# Patient Record
Sex: MALE | Race: WHITE | NOT HISPANIC OR LATINO
[De-identification: names, ages, dates, MRNs, and addresses within clinical notes are randomized per-mention and may not be internally consistent; named-entity substitution may affect disease eponyms.]

---

## 2017-06-29 ENCOUNTER — TRANSCRIPTION ENCOUNTER (OUTPATIENT)
Age: 52
End: 2017-06-29

## 2018-04-24 ENCOUNTER — HOSPITAL ENCOUNTER (EMERGENCY)
Dept: HOSPITAL 74 - FER | Age: 53
Discharge: HOME | End: 2018-04-24
Payer: COMMERCIAL

## 2018-04-24 VITALS — TEMPERATURE: 97.6 F | HEART RATE: 55 BPM | DIASTOLIC BLOOD PRESSURE: 88 MMHG | SYSTOLIC BLOOD PRESSURE: 168 MMHG

## 2018-04-24 VITALS — BODY MASS INDEX: 35.2 KG/M2

## 2018-04-24 DIAGNOSIS — F17.210: ICD-10-CM

## 2018-04-24 DIAGNOSIS — N20.0: Primary | ICD-10-CM

## 2018-04-24 DIAGNOSIS — J45.909: ICD-10-CM

## 2018-04-24 LAB
APPEARANCE UR: CLEAR
BILIRUB UR STRIP.AUTO-MCNC: NEGATIVE MG/DL
COLOR UR: YELLOW
EPITH CASTS URNS QL MICRO: (no result) /HPF
HYALINE CASTS URNS QL MICRO: 11 /LPF
KETONES UR QL STRIP: NEGATIVE
LEUKOCYTE ESTERASE UR QL STRIP.AUTO: NEGATIVE
MUCOUS THREADS URNS QL MICRO: (no result)
NITRITE UR QL STRIP: NEGATIVE
PH UR: 5 [PH] (ref 5–8)
PROT UR QL STRIP: NEGATIVE
PROT UR QL STRIP: NEGATIVE
RBC # UR STRIP: (no result) /UL
SP GR UR: 1.02 (ref 1–1.03)
UROBILINOGEN UR STRIP-MCNC: NEGATIVE MG/DL (ref 0.2–1)

## 2018-04-24 PROCEDURE — 3E0337Z INTRODUCTION OF ELECTROLYTIC AND WATER BALANCE SUBSTANCE INTO PERIPHERAL VEIN, PERCUTANEOUS APPROACH: ICD-10-PCS

## 2018-04-24 PROCEDURE — 3E0333Z INTRODUCTION OF ANTI-INFLAMMATORY INTO PERIPHERAL VEIN, PERCUTANEOUS APPROACH: ICD-10-PCS

## 2018-04-24 PROCEDURE — 3E033NZ INTRODUCTION OF ANALGESICS, HYPNOTICS, SEDATIVES INTO PERIPHERAL VEIN, PERCUTANEOUS APPROACH: ICD-10-PCS

## 2018-04-24 PROCEDURE — 3E033GC INTRODUCTION OF OTHER THERAPEUTIC SUBSTANCE INTO PERIPHERAL VEIN, PERCUTANEOUS APPROACH: ICD-10-PCS

## 2018-04-24 NOTE — PDOC
History of Present Illness





- General


Chief Complaint: Pain, Acute


Stated Complaint: RIGHT FLANK PAIN


Time Seen by Provider: 04/24/18 02:53





- History of Present Illness


Initial Comments: 





He this 53-year-old man with a history of renal colic (right sided) a few years 

ago presents with a few hour history of right-sided flank pain.  Patient states 

that pain is typical of his previous renal colic pain.  He also developed 

nausea just prior to presentation.  No fever/chills/vomiting/dysuria.  Previous 

episode of renal stones did not require cystoscopy or other instrumentation; 

stone passed spontaneously.  Patient denies any other medical problems.








Past History





- Past Medical History


Allergies/Adverse Reactions: 


 Allergies











Allergy/AdvReac Type Severity Reaction Status Date / Time


 


No Known Allergies Allergy   Verified 04/24/18 03:44











Home Medications: 


Ambulatory Orders





Oxycodone HCl/Acetaminophen [Percocet 5-325 mg Tablet] 1 tab PO Q6H PRN #12 

tablet MDD 3 tabs 04/24/18 


Tamsulosin HCl [Flomax] 0.4 mg PO DAILY #20 cap.er.24h 04/24/18 








Anemia: No


Asthma: Yes


Cancer: No


Cardiac Disorders: No


CVA: No


COPD: No


CHF: No


Dementia: No


Diabetes: No


GI Disorders: Yes (DIVERTICULITIS)


 Disorders: No


HTN: No


Hypercholesterolemia: No


Liver Disease: No


Seizures: No


Thyroid Disease: No





- Surgical History


Abdominal Surgery: Yes (RIGHT INGUINAL AND UMBILICAL HERNIA REPAIR 2002,LEFT 

INGUINAL HERNIA 1990'S)


Appendectomy: No


Cardiac Surgery: No


Cholecystectomy: No


Lung Surgery: No


Neurologic Surgery: No


Orthopedic Surgery: No





- Suicide/Smoking/Psychosocial Hx


Smoking History: Current every day smoker


Have you smoked in the past 12 months: Yes


Number of Cigarettes Smoked Daily: 40


Hx Alcohol Use: Yes (RARE)


Drug/Substance Use Hx: No


Substance Use Type: None


Hx Substance Use Treatment: No





**Review of Systems





- Review of Systems


Able to Perform ROS?: Yes


Comments:: 





12 point review of systems is negative except for what is noted in the history 

of present illness








*Physical Exam





- Physical Exam


Comments: 





GENERAL: Adult male, alert and oriented 3, in moderate distress secondary to 

right flank pain


HEAD: Normal with no signs of trauma.


EYES: PERRLA, EOMI, sclera anicteric, conjunctiva clear.


ENT: Ears normal, nares patent, oropharynx clear without exudates.  Dry mucous 

membranes.


NECK: Normal range of motion, supple without lymphadenopathy, JVD, or masses.


LUNGS: Breath sounds equal, clear to auscultation bilaterally.  No wheezes, and 

no crackles.


HEART:Regular rate and rhythm, normal S1 and S2 without murmur, rub or gallop.


ABDOMEN:.normal bowel sounds  No guarding,tenderness or rebound.No masses No 

distention. 


EXTREMITIES: Normal range of motion, no edema.  No clubbing or cyanosis. No 

erythema, or tenderness.


NEUROLOGICAL: Cranial nerves II through XII grossly intact.  Normal speech.  No 

focal 


neurological deficits. 


MUSCULOSKELETAL:  Back non-tender to palpation, right CVA tenderness


SKIN: Warm, Dry, normal turgor, no rashes or lesions noted.








Progress Note





- Progress Note


Progress Note: 





Urinalysis positive for 60 RBCs cells per high-power field





Patient given Toradol 30 mg IV; he also received a liter normal saline IV.





Patient had relief from pain with Toradol (lasting approximately 20 minutes).  

Pain recurred.  Patient given Dilaudid 0.5 mg IV





Renal stone protocol CT performed.  This revealed 1 mm distal ureter/UVJ 

junction stone.  There is moderate hydroureter nephrosis/hydroureter proximal 

to the stone.  No other renal stones evident.  No other significant abnormality 

seen.





Results of this study discussed with the patient.  Patient will have an 

additional liter of fluid and 0.4 mg of Flomax by mouth.





Medical Decision Making





- Medical Decision Making





04/24/18 05:33


Patient reports complete resolution of pain after 1 L normal saline and 0.4 mg 

Flomax.  Patient received 0.5 mg Dilaudid IV and 30 mg Toradol earlier.





*DC/Admit/Observation/Transfer


Diagnosis at time of Disposition: 


 Renal colic








- Discharge Dispostion


Disposition: HOME


Condition at time of disposition: Stable





- Prescriptions


Prescriptions: 


Oxycodone HCl/Acetaminophen [Percocet 5-325 mg Tablet] 1 tab PO Q6H PRN #12 

tablet MDD 3 tabs


 PRN Reason: Severe Pain


Tamsulosin HCl [Flomax] 0.4 mg PO DAILY #20 cap.er.24h





- Referrals


Referrals: 


Maxi Granda MD [Staff Physician] - 





- Patient Instructions


Printed Discharge Instructions:  Kidney Stones -- Adult


Additional Instructions: 


Drink plenty of water


Flomax 0.4 mg daily


Acetaminophen/ibuprofen/naproxen as needed for mild-to-moderate pain


Percocet 5/325 up to 3 times a day as needed for severe pain


Return to ER if you have persistent severe pain or experience vomiting/fever


Follow-up with urologist, Dr. Granda, if you have persistent pain





- Post Discharge Activity

## 2018-05-18 ENCOUNTER — HOSPITAL ENCOUNTER (EMERGENCY)
Dept: HOSPITAL 74 - FER | Age: 53
Discharge: HOME | End: 2018-05-18
Payer: COMMERCIAL

## 2018-05-18 VITALS — HEART RATE: 68 BPM | DIASTOLIC BLOOD PRESSURE: 88 MMHG | SYSTOLIC BLOOD PRESSURE: 156 MMHG | TEMPERATURE: 98.1 F

## 2018-05-18 VITALS — BODY MASS INDEX: 35.2 KG/M2

## 2018-05-18 DIAGNOSIS — K57.92: Primary | ICD-10-CM

## 2018-05-18 DIAGNOSIS — F17.210: ICD-10-CM

## 2018-05-18 DIAGNOSIS — J45.909: ICD-10-CM

## 2018-05-18 LAB
ALBUMIN SERPL-MCNC: 4.1 G/DL (ref 3.5–5)
ALP SERPL-CCNC: 52 U/L (ref 32–92)
ALT SERPL-CCNC: 17 U/L (ref 10–40)
ANION GAP SERPL CALC-SCNC: 5 MMOL/L (ref 8–16)
AST SERPL-CCNC: 19 U/L (ref 10–42)
BASOPHILS # BLD: 0.6 % (ref 0–2)
BILIRUB SERPL-MCNC: 0.9 MG/DL (ref 0.2–1)
BUN SERPL-MCNC: 8 MG/DL (ref 7–18)
CALCIUM SERPL-MCNC: 8.7 MG/DL (ref 8.4–10.2)
CHLORIDE SERPL-SCNC: 103 MMOL/L (ref 98–107)
CO2 SERPL-SCNC: 25 MMOL/L (ref 22–28)
COLOR UR: (no result)
CREAT SERPL-MCNC: < 0.8 MG/DL (ref 0.6–1.3)
DEPRECATED RDW RBC AUTO: 12.5 % (ref 11.9–15.9)
EOSINOPHIL # BLD: 2.2 % (ref 0–4.5)
GLUCOSE SERPL-MCNC: 121 MG/DL (ref 74–106)
HCT VFR BLD CALC: 45 % (ref 35.4–49)
HGB BLD-MCNC: 15.9 GM/DL (ref 11.7–16.9)
LIPASE SERPL-CCNC: 97 U/L (ref 73–393)
LYMPHOCYTES # BLD: 15.6 % (ref 8–40)
MCH RBC QN AUTO: 31 PG (ref 25.7–33.7)
MCHC RBC AUTO-ENTMCNC: 35.3 G/DL (ref 32–35.9)
MCV RBC: 87.8 FL (ref 80–96)
MONOCYTES # BLD AUTO: 5.8 % (ref 3.8–10.2)
NEUTROPHILS # BLD: 75.8 % (ref 42.8–82.8)
PH UR: 5 [PH] (ref 4.5–8)
PLATELET # BLD AUTO: 157 K/MM3 (ref 134–434)
PMV BLD: 7.6 FL (ref 7.5–11.1)
POTASSIUM SERPLBLD-SCNC: 3.6 MMOL/L (ref 3.5–5.1)
PROT SERPL-MCNC: 6.8 G/DL (ref 6.4–8.3)
RBC # BLD AUTO: 5.13 M/MM3 (ref 4–5.6)
SODIUM SERPL-SCNC: 133 MMOL/L (ref 136–145)
SP GR UR: 1.02 (ref 1–1.02)
UROBILINOGEN UR STRIP-MCNC: 0.2 MG/DL (ref 0.2–1)
WBC # BLD AUTO: 7.9 K/MM3 (ref 4–10.8)

## 2018-05-18 PROCEDURE — 3E033NZ INTRODUCTION OF ANALGESICS, HYPNOTICS, SEDATIVES INTO PERIPHERAL VEIN, PERCUTANEOUS APPROACH: ICD-10-PCS | Performed by: EMERGENCY MEDICINE

## 2018-05-18 PROCEDURE — 3E0337Z INTRODUCTION OF ELECTROLYTIC AND WATER BALANCE SUBSTANCE INTO PERIPHERAL VEIN, PERCUTANEOUS APPROACH: ICD-10-PCS | Performed by: EMERGENCY MEDICINE

## 2018-05-18 NOTE — PDOC
History of Present Illness





- General


Chief Complaint: Pain


Stated Complaint: ABDOMINAL PAIN


Time Seen by Provider: 05/18/18 10:31





- History of Present Illness


Initial Comments: 





05/18/18 10:48


53 M with h/o recurrent diverticulitis, kidney stones, presents to ED with 

abdominal pain x 5 days. Pt reports pain wrapping around the bottom of his 

abdomen, more severe in the LLQ. Denies F/C. Denies N/V/D but endorses some 

soft stools. Denies BRBPR or dark tarry stools. No flank pain. States this does 

not feel like a kidney stone. Pt has had hernia repairs in the past.





Past History





- Past Medical History


Allergies/Adverse Reactions: 


 Allergies











Allergy/AdvReac Type Severity Reaction Status Date / Time


 


No Known Allergies Allergy   Verified 05/18/18 10:31











Home Medications: 


Ambulatory Orders





Albuterol Sulfate [Proair Hfa] 8.5 gm IH QID PRN 05/18/18 








Anemia: No


Asthma: Yes


Cancer: No


Cardiac Disorders: No


CVA: No


COPD: No


CHF: No


Dementia: No


Diabetes: No


GI Disorders: Yes (DIVERTICULITIS)


 Disorders: No


HTN: No


Hypercholesterolemia: No


Kidney Stones: Yes


Liver Disease: No


Seizures: No


Thyroid Disease: No





- Surgical History


Abdominal Surgery: Yes (RIGHT INGUINAL AND UMBILICAL HERNIA REPAIR 2002,LEFT 

INGUINAL HERNIA 1990'S)


Appendectomy: No


Cardiac Surgery: No


Cholecystectomy: No


Lung Surgery: No


Neurologic Surgery: No


Orthopedic Surgery: No





- Suicide/Smoking/Psychosocial Hx


Smoking History: Current every day smoker


Have you smoked in the past 12 months: Yes


Number of Cigarettes Smoked Daily: 40


Information on smoking cessation initiated: Yes


'Breaking Loose' booklet given: 05/18/18


Hx Alcohol Use: No


Drug/Substance Use Hx: No


Substance Use Type: None


Hx Substance Use Treatment: No





**Review of Systems





- Review of Systems


Comments:: 





05/18/18 10:49


"GENERAL/CONSTITUTIONAL: No fever or chills. No weakness.


HEAD, EYES, EARS, NOSE AND THROAT: No change in vision. No ear pain or 

discharge. No sore throat.


CARDIOVASCULAR: No chest pain or shortness of breath.


RESPIRATORY: No cough, wheezing, or hemoptysis.


GASTROINTESTINAL: + abdominal pain, No nausea, vomiting, diarrhea or 

constipation.


GENITOURINARY: No dysuria, frequency, or change in urination.


MUSCULOSKELETAL: No joint or muscle swelling or pain. No neck or back pain.


SKIN: No rash


NEUROLOGIC: No headache, vertigo, loss of consciousness, or change in strength/

sensation.


ENDOCRINE: No increased thirst. No abnormal weight change.


HEMATOLOGIC/LYMPHATIC: No anemia, easy bleeding, or history of blood clots.


ALLERGIC/IMMUNOLOGIC: No hives or skin allergy.


"





*Physical Exam





- Vital Signs


 Last Vital Signs











Temp Pulse Resp BP Pulse Ox


 


 98.1 F   68   18   156/88   97 


 


 05/18/18 10:30  05/18/18 10:30  05/18/18 10:30  05/18/18 10:30  05/18/18 10:30














- Physical Exam


Comments: 





05/18/18 10:49


"GENERAL: Awake, alert, and fully oriented, in no acute distress.


HEAD: No signs of trauma


EYES: PERRLA, EOMI, sclera anicteric, conjunctiva clear


ENT: Auricles normal inspection, hearing grossly normal, nares patent, 

oropharynx clear without exudates. Moist mucosa


NECK: Nontender, no stepoffs, Normal ROM, supple, no lymphadenopathy, JVD, or 

masses


LUNGS: Breath sounds equal, clear to auscultation bilaterally.  No wheezes, and 

no crackles


HEART: Regular rate and rhythm, normal S1 and S2, no murmurs, rubs or gallops


ABDOMEN: + LLQ tenderness, normoactive bowel sounds.  No guarding, no rebound.  

No masses


EXTREMITIES: Normal range of motion, no edema.  No clubbing or cyanosis. No 

cords, erythema, or tenderness


NEUROLOGICAL: Cranial nerves II through XII intact. 5/5 strength and sensation 

in all extremities, Normal speech, normal gait, normal cerebellar function


SKIN: Warm, Dry, normal turgor, no rashes or lesions noted.


"





ED Treatment Course





- LABORATORY


CBC & Chemistry Diagram: 


 05/18/18 10:52





 05/18/18 10:52





- RADIOLOGY


Radiology Studies Ordered: 














 Category Date Time Status


 


 ABDOMEN & PELVIS CT WITH CONTR [CT] Stat CT Scan  05/18/18 10:43 Ordered














Medical Decision Making





- Medical Decision Making





05/18/18 10:49


53 M with LLQ tenderness, consistent with pt's h/o diverticulitis. Also 

consider renal colic given h/o kidney stones.


- Labs, UA


- CTAP


- IVF, tylenol





05/18/18 14:11


Labs wnl


CT shows sigmoid and ascending colonic diverticulitis, no perf or abscess.





Pt reassessed - pain is now well controlled, pt tolerating PO.


Will DC with cipro/flagyl.


Pt is well appearing, with normal vitals. Clinically stable for DC at this time.


I discussed the physical exam findings, ancillary test results and final 

diagnoses with the patient. I answered all of the patient's questions. The 

patient was satisfied with the care received and felt comfortable with the 

discharge plan and treatment plan.  The patient agrees to follow up with the 

primary care physician within 24-72 hours.








*DC/Admit/Observation/Transfer


Diagnosis at time of Disposition: 


 Diverticulitis








- Discharge Dispostion


Disposition: HOME


Condition at time of disposition: Good





- Referrals


Referrals: 


Chaitanya Esposito MD [Primary Care Provider] - 





- Patient Instructions


Printed Discharge Instructions:  DI for Diverticulitis


Additional Instructions: 


Take the antibiotics as prescribed to treat your diverticulitis.


If you experience any worsening pain, fevers, or any other concerning symptoms, 

return to the ER immediately.


Otherwise, follow up with your primary doctor within 1 week.





- Post Discharge Activity





- Attestations


Physician Attestion: 





05/18/18 14:12








I, Dr. Leoncio Moran MD, attest that this document has been prepared under my 

direction and personally reviewed by me in its entirety.   I further attest, 

that it accurately reflects all work, treatment, procedures and medical decision

-making performed by me.

## 2019-07-20 ENCOUNTER — HOSPITAL ENCOUNTER (EMERGENCY)
Dept: HOSPITAL 74 - FER | Age: 54
Discharge: HOME | End: 2019-07-20
Payer: COMMERCIAL

## 2019-07-20 VITALS — TEMPERATURE: 98.8 F | SYSTOLIC BLOOD PRESSURE: 156 MMHG | DIASTOLIC BLOOD PRESSURE: 88 MMHG | HEART RATE: 93 BPM

## 2019-07-20 VITALS — BODY MASS INDEX: 33 KG/M2

## 2019-07-20 DIAGNOSIS — J45.909: ICD-10-CM

## 2019-07-20 DIAGNOSIS — Z87.442: ICD-10-CM

## 2019-07-20 DIAGNOSIS — K57.92: Primary | ICD-10-CM

## 2019-07-20 DIAGNOSIS — Z87.891: ICD-10-CM

## 2019-07-20 LAB
ALBUMIN SERPL-MCNC: 4.2 G/DL (ref 3.4–5)
ALP SERPL-CCNC: 38 U/L (ref 45–117)
ALT SERPL-CCNC: 24 U/L (ref 13–61)
ANION GAP SERPL CALC-SCNC: 7 MMOL/L (ref 8–16)
AST SERPL-CCNC: 21 U/L (ref 15–37)
BILIRUB SERPL-MCNC: 1.4 MG/DL (ref 0.2–1)
BUN SERPL-MCNC: 8 MG/DL (ref 7–18)
CALCIUM SERPL-MCNC: 9.1 MG/DL (ref 8.5–10)
CHLORIDE SERPL-SCNC: 104 MMOL/L (ref 98–107)
CO2 SERPL-SCNC: 26 MMOL/L (ref 21–32)
CREAT SERPL-MCNC: 0.8 MG/DL (ref 0.55–1.3)
DEPRECATED RDW RBC AUTO: 13.1 % (ref 11.9–15.9)
GLUCOSE SERPL-MCNC: 90 MG/DL (ref 74–106)
HCT VFR BLD CALC: 47.3 % (ref 35.4–49)
HGB BLD-MCNC: 16.2 GM/DL (ref 11.7–16.9)
MCH RBC QN AUTO: 30.8 PG (ref 25.7–33.7)
MCHC RBC AUTO-ENTMCNC: 34.3 G/DL (ref 32–35.9)
MCV RBC: 89.8 FL (ref 80–96)
PLATELET # BLD AUTO: 138 K/MM3 (ref 134–434)
PLATELET BLD QL SMEAR: ADEQUATE
PMV BLD: 8.6 FL (ref 7.5–11.1)
POTASSIUM SERPLBLD-SCNC: 3.8 MMOL/L (ref 3.5–5.1)
PROT SERPL-MCNC: 6.9 G/DL (ref 6.4–8.2)
RBC # BLD AUTO: 5.27 M/MM3 (ref 4–5.6)
SODIUM SERPL-SCNC: 137 MMOL/L (ref 136–145)
WBC # BLD AUTO: 10.4 K/MM3 (ref 4–10.8)

## 2019-07-20 NOTE — PDOC
History of Present Illness





- General


Chief Complaint: Pain


Stated Complaint: ABD PAIN


Time Seen by Provider: 07/20/19 12:43





- History of Present Illness


Initial Comments: 





07/20/19 12:50


Pt presents to the ED complaining of diffuse abdominal pain that began 

yesterday.  Denies fever, nausea or vomiting.  history of frequent episodes of 

diverticulitis, never has had complications, symptoms have always resolved with 

cipro and flagyl.  Pain is diffuse, was severe yesterday, but today is 

moderate.  Pain is slightly different than his diverticulitis pain, which is 

usually lower in his abdomen.  Denies nausea, vomiting, urinary complaints or 

change in bowel habits.  Able to tolerate his normal diet with no difficulty. 





Past History





- Past Medical History


Allergies/Adverse Reactions: 


 Allergies











Allergy/AdvReac Type Severity Reaction Status Date / Time


 


No Known Allergies Allergy   Verified 07/20/19 12:23











Home Medications: 


Ambulatory Orders





Albuterol Sulfate [Proair Hfa] 8.5 gm IH QID PRN 05/18/18 


Ciprofloxacin HCl [Cipro] 500 mg PO BID #14 tablet 07/20/19 


metroNIDAZOLE [Flagyl -] 500 mg PO QID #28 tablet 07/20/19 








Anemia: No


Asthma: Yes


Cancer: No


Cardiac Disorders: No


CVA: No


COPD: No


CHF: No


Dementia: No


Diabetes: No


GI Disorders: Yes (DIVERTICULITIS)


 Disorders: No


HTN: No


Hypercholesterolemia: No


Kidney Stones: Yes


Liver Disease: No


Seizures: No


Thyroid Disease: No





- Surgical History


Abdominal Surgery: Yes (RIGHT INGUINAL AND UMBILICAL HERNIA REPAIR 2002,LEFT 

INGUINAL HERNIA 1990'S)


Appendectomy: No


Cardiac Surgery: No


Cholecystectomy: No


Lung Surgery: No


Neurologic Surgery: No


Orthopedic Surgery: No





- Suicide/Smoking/Psychosocial Hx


Smoking History: Never smoked


Have you smoked in the past 12 months: No


Number of Cigarettes Smoked Daily: 40


Information on smoking cessation initiated: No


'Breaking Loose' booklet given: 05/18/18


Hx Alcohol Use: No


Drug/Substance Use Hx: No


Substance Use Type: None


Hx Substance Use Treatment: No





**Review of Systems





- Review of Systems


Able to Perform ROS?: Yes


Is the patient limited English proficient: No


Constitutional: No: Symptoms Reported, See HPI, Chills, Diaphoresis, Fever, 

Loss of Appetite, Malaise, Night Sweats, Weakness, Weight Stable, Unintentional 

Wgt. Loss, Unexplained wgt Loss, Other


HEENTM: No: Symptoms Reported, See HPI, Eye Pain, Blurred Vision, Tearing, 

Recent change in vision, Double Vision, Cataracts, Ear Pain, Ocular Prothesis, 

Ear Discharge, Nose Pain, Nose Congestion, Tinnitus, Nose Bleeding, Hearing Loss

, Throat Pain, Throat Swelling, Mouth Pain, Dental Problems, Difficulty 

Swallowing, Mouth Swelling, Other


Respiratory: No: Symptoms reported, See HPI, Cough, Orthopnea, Shortness of 

Breath, SOB with Exertion, SOB at Rest, Stridor, Wheezing, Productive cough, 

Hemoptysis, Other


Cardiac (ROS): No: Symptoms Reported, See HPI, Chest Pain, Edema, Irregular 

Heart Rate, Lightheadedness (abdominal pain), Palpitations, Syncope, Chest 

Tightness, Other


ABD/GI: Yes: Symptoms Reported (abdominal pain).  No: Abdominal Distended, Abd. 

Pain w/ defecation, Blood Streaked Bowels, Constipated, Diarrhea, Difficulty 

Swallowing, Nausea, Poor Appetite, Poor Fluid Intake, Rectal Bleeding, Vomiting

, Indigestion, Abdominal cramping, Tarry Stools, Other


: No: Symptoms Reported, See HPI, Burning, Dysuria, Discharge, Frequency, 

Flank Pain, Hematuria, Incontinence, Pain, Urgency, Testicular Mass, Testicular 

Swelling, Lesions, Testicular Pain, Other


Musculoskeletal: No: Symptoms Reported, See HPI, Back Pain, Gout, Joint Pain, 

Joint Swelling, Muscle Pain, Muscle Weakness, Neck Pain, Joint Stiffness, Other


Integumentary: No: Symptoms Reported, See HPI, Bruising, Change in Color, 

Change in Hair/Nails, Dryness, Erythema, Flushing, Lesions, Lumps, Pallor, 

Pruritus, Rash, Sweating, Other


Neurological: No: Symptoms reported, See HPI, Headache, Numbness, Paresthesia, 

Pre-Existing Deficit, Seizure, Tingling, Tremors, Weakness, Unsteady Gait, 

Ataxia, Dizziness, Other


All Other Systems: Reviewed and Negative





*Physical Exam





- Vital Signs


 Last Vital Signs











Temp Pulse Resp BP Pulse Ox


 


 98.8 F   93 H  20   156/88   95 


 


 07/20/19 12:23  07/20/19 12:23  07/20/19 12:23  07/20/19 12:23  07/20/19 12:23














- Physical Exam


Comments: 





07/20/19 13:08


gen: alert, NAD


HEENT: normocephalic, atraumatic


CV: RRR no m/r/g


Pulm: CTA b/l


Abdomen: soft, non tender, non distended, no guarding or rebound


ext: no edema or tenderness


Neuro: alert and oriented x3, ambulatory with a normal gait, normal mood and 

affect





ED Treatment Course





- LABORATORY


CBC & Chemistry Diagram: 


 07/20/19 12:50





 07/20/19 12:50





Medical Decision Making





- Medical Decision Making





07/20/19 13:10


Pt presents to the ED with a complaint of very mild diffuse abdominal pain.  

Abdomen is non tender and patient has no other GI complaints.  May be a 

recurrence of diverticulitis--if so, seems likely to be mild given his lack of 

systemic or other GI complaints.  Will check labs and discharge home with cipro/

flagyl and prompt follow up with PMD if labs are normal.  If labs are abnormal, 

will consider imaging. 





*DC/Admit/Observation/Transfer


Diagnosis at time of Disposition: 


 Diverticulitis








- Discharge Dispostion


Disposition: HOME


Condition at time of disposition: Good


Decision to Admit order: No





- Prescriptions


Prescriptions: 


Ciprofloxacin HCl [Cipro] 500 mg PO BID #14 tablet


metroNIDAZOLE [Flagyl -] 500 mg PO QID #28 tablet





- Referrals





- Patient Instructions


Printed Discharge Instructions:  DI for Diverticulitis


Additional Instructions: 


return to the ED for severe pain, pain with nausea and vomiting, fever, other 

new or worsening symptoms.  Call Dr. Esposito for follow up on Monday.  





- Post Discharge Activity

## 2019-11-04 ENCOUNTER — HOSPITAL ENCOUNTER (EMERGENCY)
Dept: HOSPITAL 74 - FER | Age: 54
Discharge: HOME | End: 2019-11-04
Payer: COMMERCIAL

## 2019-11-04 VITALS — HEART RATE: 66 BPM | TEMPERATURE: 98.1 F | DIASTOLIC BLOOD PRESSURE: 95 MMHG | SYSTOLIC BLOOD PRESSURE: 170 MMHG

## 2019-11-04 VITALS — BODY MASS INDEX: 33.7 KG/M2

## 2019-11-04 DIAGNOSIS — Z87.442: ICD-10-CM

## 2019-11-04 DIAGNOSIS — J45.909: ICD-10-CM

## 2019-11-04 DIAGNOSIS — M79.651: Primary | ICD-10-CM

## 2019-11-04 DIAGNOSIS — F17.210: ICD-10-CM

## 2019-11-04 NOTE — PDOC
History of Present Illness





- General


Chief Complaint: Pain


Stated Complaint: GROIN & THIGH PAIN


Time Seen by Provider: 11/04/19 09:22


History Source: Patient


Exam Limitations: No Limitations





- History of Present Illness


Initial Comments: 





11/04/19 12:08


HPI: 


54M PMH Sciatica, Asthma c/o 10 days of right thigh and inguinal crease pain 

that only occurs with sitting. Pain onsets minutes after sitting but is 

resolved w/ standing or laying down. The pain has worsened over the past couple 

days. Pt states this pain is different than his sciatica pain. Denies f/c, n/v/

abdpain, cp/sob, numbness/tingling, new testicular pain/swelling, dysuria/

hematuria/frequency. Hx 5 hernia repairs. 








Past History





- Past Medical History


Allergies/Adverse Reactions: 


 Allergies











Allergy/AdvReac Type Severity Reaction Status Date / Time


 


No Known Allergies Allergy   Verified 07/20/19 12:23











Home Medications: 


Ambulatory Orders





Ibuprofen 800 mg PO TID PRN 11/04/19 


Oxycodone HCl/Acetaminophen [Percocet 5-325 mg Tablet] 1 tab PO Q8H PRN #15 

tablet MDD 3 11/04/19 








Anemia: No


Asthma: Yes


Cancer: No


Cardiac Disorders: No


CVA: No


COPD: No


CHF: No


Dementia: No


Diabetes: No


GI Disorders: Yes (DIVERTICULITIS)


 Disorders: No


HTN: No


Hypercholesterolemia: No


Kidney Stones: Yes


Liver Disease: No


Seizures: No


Thyroid Disease: No





- Surgical History


Abdominal Surgery: Yes (RIGHT INGUINAL AND UMBILICAL HERNIA REPAIR 2002,LEFT 

INGUINAL HERNIA 1990'S)


Appendectomy: No


Cardiac Surgery: No


Cholecystectomy: No


Lung Surgery: No


Neurologic Surgery: No


Orthopedic Surgery: No





- Psycho Social/Smoking Cessation Hx


Smoking History: Current every day smoker


Have you smoked in the past 12 months: No


Number of Cigarettes Smoked Daily: 40


Information on smoking cessation initiated: Yes


'Breaking Loose' booklet given: 05/18/18


Hx Alcohol Use: No


Drug/Substance Use Hx: No


Substance Use Type: None


Hx Substance Use Treatment: No





**Review of Systems





- Review of Systems


Able to Perform ROS?: Yes


Comments:: 





11/04/19 12:08


ROS: 


CONSTITUTIONAL: Denies F / C


HEENT: Denies headache, lightheadedness, dizziness, changes in vision / hearing

, diplopia, blurry vision. Denies sore throat, rhinorrhea. 


RESP: Endorses longstanding sob but no new changes (asthma, current 2 PPD smoker

). Endorses cough (tx w/ abx) about a month ago. 


CARD: Denies chest pain, palpitations


GI: Denies N / V / D, abdominal pain, inability to tolerate PO


: Denies dysuria, hematuria, frequency. Endorses longstanding and UNCHANGED 

right testicular tenderness and left testicular swelling. 


SKIN: Denies rashes


NEURO: Denies numbness, tingling, weakness














Is the patient limited English proficient: No





*Physical Exam





- Vital Signs


 Last Vital Signs











Temp Pulse Resp BP Pulse Ox


 


 98.1 F   66   18   170/95   98 


 


 11/04/19 09:07  11/04/19 09:07  11/04/19 09:07  11/04/19 09:07  11/04/19 09:07














- Physical Exam


Comments: 





11/04/19 12:08





PE: 


VSWNL


GEN: Well appearing, NAD, comfortable. AAOx3


HEENT: NC/AT, EOMI, PERRLA. No facial asymmetry. Normal voice. Supple neck w/ 

FROM.


CV: S1/S2, RRR, no m/r/g


LUNG: CTAB, no wheezes, crackles, rales, rhonchi. 


GI: soft, ndnt, +BS, no guarding, no rebound. 


EXTREMITIES: No LE edema. No calf tenderness. No obvious deformities of all 

extremities. 


SKIN: warm, dry, normal turgor 


PSYCH: normal mood and affect  


NEURO: Ambulates w/ normal gait. 5/5 strength of b/l LE. Symmetric sensation 


BACK: no step offs, no midline TTP


: + Left scrotal swelling, nonerythematous, chronic. No TTP of the b/l 

testicles. No hernias or testicular masses palpated. No bleeding or discharge 

at meatus.





Medical Decision Making





- Medical Decision Making





11/04/19 10:35


MDM: 


54M w/ 10 days of positional leg pain w/o swelling


Likely nerve impingement given pain only occurs w/ sitting


DC home w/ PCP f/u, work note, and return precautions 


ED Attending sent prescription pain medication to pt pharmacy











Discharge





- Discharge Information


Problems reviewed: Yes


Clinical Impression/Diagnosis: 


Thigh pain


Qualifiers:


 Laterality: right Qualified Code(s): M79.651 - Pain in right thigh





Condition: Good


Disposition: HOME





- Admission


No





- Additional Discharge Information


Prescriptions: 


Oxycodone HCl/Acetaminophen [Percocet 5-325 mg Tablet] 1 tab PO Q8H PRN #15 

tablet MDD 3


 PRN Reason: Severe Pain





- Follow up/Referral


Referrals: 


Chaitanya Esposito MD [Primary Care Provider] - 





- Patient Discharge Instructions


Patient Printed Discharge Instructions:  DI for Leg Pain, DI for Prescription 

Opioid Use


Additional Instructions: 


We have sent a prescription to your pharmacy. Pick it up and take as 

prescribed. You are PROHIBITED from operating motor vehicles if you take this 

medication. 


Follow up with your primary care doctor within the next 3 days regarding your 

complaints. You require further outpatient workup for these complaints. 





IMMEDIATELY return to the closest Emergency Department if you experience any of 

the following: 


- worsening testicular pain or swelling 


- chest pain


- shortness of breath


- new, worsening, or concerning symptoms 





- Post Discharge Activity


Work/Back to School Note:  Back to Work

## 2019-11-04 NOTE — PDOC
Attending Attestation





- Resident


Resident Name: Johnnie Ochoa





- HPI


HPI: 





11/04/19 10:38


Pt presents to the ED complaining of severe pain in his R groin and R leg that 

occurs only when sitting down.  Patient has a long standing history of both 

sciatica and multiple inguinal hernias.  Denies trauma.  Denies fever, swelling

, nausea or  vomiting or other signs of systemic infection.  Patient states 

that the pain is much different than his sciatica pain. 





- Physicial Exam


PE: 





11/04/19 11:22


Agree with resident exam.  Patient is alert and well appearing and in no acute 

distress.  No swelling or erythema of the RLE.  intact DP and PT pulses while 

seated.  No masses.  Normal ROM at hip, knee and ankle.  Ambulatory with normal 

gait. 





- Medical Decision Making





11/04/19 11:24


Pt presents to the ED complaining of RLE pain with sitting only.  No signs 

consistent with DVT, infection, decreased circulation.  No limitations of ROM.  

Ambulatory with normal gait.  Symptoms are most consistent with nerve 

compression.  Patient understands that he must follow up with Dr. Esposito to 

obtain outpaitent care, including an MRI.  Will discharge with percoset for 

pain control and instructions to return for worsening symptoms. 


11/04/19 11:25

## 2019-11-04 NOTE — PDOC
History of Present Illness





- General


Chief Complaint: Pain


Stated Complaint: GROIN & THIGH PAIN


Time Seen by Provider: 11/04/19 09:22





Past History





- Past Medical History


Allergies/Adverse Reactions: 


 Allergies











Allergy/AdvReac Type Severity Reaction Status Date / Time


 


No Known Allergies Allergy   Verified 07/20/19 12:23











Home Medications: 


Ambulatory Orders





Ibuprofen 800 mg PO TID PRN 11/04/19 








Anemia: No


Asthma: Yes


Cancer: No


Cardiac Disorders: No


CVA: No


COPD: No


CHF: No


Dementia: No


Diabetes: No


GI Disorders: Yes (DIVERTICULITIS)


 Disorders: No


HTN: No


Hypercholesterolemia: No


Kidney Stones: Yes


Liver Disease: No


Seizures: No


Thyroid Disease: No





- Surgical History


Abdominal Surgery: Yes (RIGHT INGUINAL AND UMBILICAL HERNIA REPAIR 2002,LEFT 

INGUINAL HERNIA 1990'S)


Appendectomy: No


Cardiac Surgery: No


Cholecystectomy: No


Lung Surgery: No


Neurologic Surgery: No


Orthopedic Surgery: No





- Psycho Social/Smoking Cessation Hx


Smoking History: Current every day smoker


Have you smoked in the past 12 months: No


Number of Cigarettes Smoked Daily: 40


Information on smoking cessation initiated: Yes


'Breaking Loose' booklet given: 05/18/18


Hx Alcohol Use: No


Drug/Substance Use Hx: No


Substance Use Type: None


Hx Substance Use Treatment: No





*Physical Exam





- Vital Signs


 Last Vital Signs











Temp Pulse Resp BP Pulse Ox


 


 98.1 F   66   18   170/95   98 


 


 11/04/19 09:07  11/04/19 09:07  11/04/19 09:07  11/04/19 09:07  11/04/19 09:07














Discharge





- Discharge Information


Condition: Good





- Follow up/Referral


Referrals: 


Chaitanya Esposito MD [Primary Care Provider] - 





- Patient Discharge Instructions





- Post Discharge Activity

## 2020-07-29 ENCOUNTER — HOSPITAL ENCOUNTER (EMERGENCY)
Dept: HOSPITAL 74 - FER | Age: 55
Discharge: HOME | End: 2020-07-29
Payer: COMMERCIAL

## 2020-07-29 VITALS — BODY MASS INDEX: 35.2 KG/M2

## 2020-07-29 VITALS — SYSTOLIC BLOOD PRESSURE: 170 MMHG | TEMPERATURE: 98 F | DIASTOLIC BLOOD PRESSURE: 82 MMHG | HEART RATE: 54 BPM

## 2020-07-29 DIAGNOSIS — K57.92: Primary | ICD-10-CM

## 2020-07-29 LAB
ALBUMIN SERPL-MCNC: 4.4 G/DL (ref 3.4–5)
ALP SERPL-CCNC: 43 U/L (ref 45–117)
ALT SERPL-CCNC: 20 U/L (ref 13–61)
ANION GAP SERPL CALC-SCNC: 6 MMOL/L (ref 8–16)
AST SERPL-CCNC: 19 U/L (ref 15–37)
BASOPHILS # BLD: 1.6 % (ref 0–2)
BILIRUB SERPL-MCNC: 1.8 MG/DL (ref 0.2–1)
BUN SERPL-MCNC: 11 MG/DL (ref 7–18)
CALCIUM SERPL-MCNC: 9.2 MG/DL (ref 8.5–10)
CHLORIDE SERPL-SCNC: 102 MMOL/L (ref 98–107)
CO2 SERPL-SCNC: 25 MMOL/L (ref 21–32)
CREAT SERPL-MCNC: 0.7 MG/DL (ref 0.55–1.3)
DEPRECATED RDW RBC AUTO: 12.5 % (ref 11.9–15.9)
EOSINOPHIL # BLD: 2.3 % (ref 0–4.5)
GLUCOSE SERPL-MCNC: 99 MG/DL (ref 74–106)
HCT VFR BLD CALC: 47 % (ref 35.4–49)
HGB BLD-MCNC: 16.6 GM/DL (ref 11.7–16.9)
LIPASE SERPL-CCNC: 82 U/L (ref 73–393)
LYMPHOCYTES # BLD: 22.3 % (ref 8–40)
MCH RBC QN AUTO: 30.8 PG (ref 25.7–33.7)
MCHC RBC AUTO-ENTMCNC: 35.4 G/DL (ref 32–35.9)
MCV RBC: 86.9 FL (ref 80–96)
MONOCYTES # BLD AUTO: 5.5 % (ref 3.8–10.2)
NEUTROPHILS # BLD: 68.3 % (ref 42.8–82.8)
PLATELET # BLD AUTO: 137 K/MM3 (ref 134–434)
PMV BLD: 7.9 FL (ref 7.5–11.1)
POTASSIUM SERPLBLD-SCNC: 3.9 MMOL/L (ref 3.5–5.1)
PROT SERPL-MCNC: 7 G/DL (ref 6.4–8.2)
RBC # BLD AUTO: 5.41 M/MM3 (ref 4–5.6)
SODIUM SERPL-SCNC: 133 MMOL/L (ref 136–145)
WBC # BLD AUTO: 8.1 K/MM3 (ref 4–10.8)

## 2020-07-29 NOTE — PDOC
History of Present Illness





- General


Chief Complaint: Pain


Stated Complaint: ABD PAIN


Time Seen by Provider: 07/29/20 10:18


History Source: Patient


Exam Limitations: No Limitations





- History of Present Illness


Initial Comments: 





07/29/20 11:31


54 yo M h/o recurrent diverticulitis, never complicated, responds to cipro and 

flagyl p/w lower abd pain, constant x2 days feels similar to previous episodes 

of diverticulitis. Denies n/v. Reports diarrhea for the few days prior to onset 

of abd pain, now states stools are soft. No blood in stool. No fever. Tolerating

PO. Denies cough, CP, SOB. Denies urinary complaints. 











Past History





- Medical History


Allergies/Adverse Reactions: 


                                    Allergies











Allergy/AdvReac Type Severity Reaction Status Date / Time


 


No Known Allergies Allergy   Verified 07/29/20 10:02











Home Medications: 


Ambulatory Orders





Ciprofloxacin [Cipro -] 500 mg PO Q12H #20 tablet 07/29/20 


metroNIDAZOLE [Flagyl -] 500 mg PO TID #30 tablet 07/29/20 








Anemia: No


Asthma: Yes


Cancer: No


Cardiac Disorders: No


CVA: No


COPD: No


CHF: No


Dementia: No


Diabetes: No


GI Disorders: Yes (DIVERTICULITIS)


 Disorders: No


HTN: No


Hypercholesterolemia: No


Kidney Stones: Yes


Liver Disease: No


Seizures: No


Thyroid Disease: No


Other medical history: ENLARGED SPLEE, FATTY LIVER, KIDDNEY TUMOR





- Surgical History


Abdominal Surgery: Yes (RIGHT INGUINAL AND UMBILICAL HERNIA REPAIR 2002,LEFT 

INGUINAL HERNIA 1990'S)


Appendectomy: No


Cardiac Surgery: No


Cholecystectomy: No


Lung Surgery: No


Neurologic Surgery: No


Orthopedic Surgery: No





- Psycho-Social/Smoking History


Smoking History: Current every day smoker


Have you smoked in the past 12 months: No


Number of Cigarettes Smoked Daily: 40


Information on smoking cessation initiated: No


'Breaking Loose' booklet given: 05/18/18





- Substance Abuse Hx (Audit-C & DAST Scrn)


How often the patient has a drink containing alcohol: Never


Score: In Men: 4 or > Positive; In Women: 3 or > Positive: 0


Screen Result (Pos requires Nsg. Audit-10AR): Negative


In the last yr the pt used illegal drug/Rx for NonMed reason: No


Score:  Yes response is considered Positive: 0


Screen Result (Positive result requires Nsg. DAST-10): Negative





**Review of Systems





- Review of Systems


Able to Perform ROS?: Yes


Comments:: 





07/29/20 11:33


GENERAL/CONSTITUTIONAL: No fever or chills. No weakness.





HEAD, EYES, EARS, NOSE AND THROAT: No change in vision. No ear pain or 

discharge. No sore throat.





CARDIOVASCULAR: No chest pain or shortness of breath.





RESPIRATORY: No cough, wheezing, or hemoptysis.





GASTROINTESTINAL: No nausea, vomiting, or constipation. +soft stool





GENITOURINARY: No dysuria, frequency, or change in urination.





MUSCULOSKELETAL: No joint or muscle swelling or pain. No neck or back pain.





SKIN: No rash.





NEUROLOGIC: No headache, vertigo, loss of consciousness, or change in 

strength/sensation.





ENDOCRINE: No increased thirst. No abnormal weight change.





HEMATOLOGIC/LYMPHATIC: No anemia, easy bleeding, or history of blood clots.





ALLERGIC/IMMUNOLOGIC: No hives or skin allergy.





*Physical Exam





- Vital Signs


                                Last Vital Signs











Temp Pulse Resp BP Pulse Ox


 


 98 F   54 L  20   170/82   100 


 


 07/29/20 10:01  07/29/20 10:01  07/29/20 10:01  07/29/20 10:01  07/29/20 10:01














- Physical Exam





07/29/20 11:34


GENERAL: Well appearing, in no acute distress





HEAD: NCAT





EYES: EOMI, sclera anicteric, conjunctiva clear





ENT: Auricles normal inspection, nares patent, oropharynx clear without 

exudates. MMM





NECK: Normal ROM, supple





LUNGS: CTAB. Good air entry.  No wheezes, No Rhonchi and no crackles





HEART: RRR, + s1 s2, no murmurs, rubs or gallops





ABDOMEN: Soft, nontender, normoactive bowel sounds.  No guarding, no rebound.  

No masses





EXTREMITIES: Warm and well perfused. No LE edema. FROM.  No clubbing or 

cyanosis. No cords, erythema, or tenderness





NEUROLOGICAL: Aox3, Speech fluent, face symmetric, tongue/uvula midline. 

Sensation grossly intact to light touch. Ambulatory with steady gait. Strength 

intact. No focal deficits.





SKIN: Warm, dry, normal turgor, no rashes or lesions noted.





ED Treatment Course





- LABORATORY


CBC & Chemistry Diagram: 


                                 07/29/20 10:54





                                 07/29/20 10:58





- ADDITIONAL ORDERS


Additional order review: 


                               Laboratory  Results











  07/29/20





  10:58


 


Sodium  133 L


 


Potassium  3.9


 


Chloride  102


 


Carbon Dioxide  25


 


Anion Gap  6 L


 


BUN  11.0


 


Creatinine  0.7


 


Est GFR (CKD-EPI)AfAm  121.41


 


Est GFR (CKD-EPI)NonAf  104.76


 


Random Glucose  99


 


Calcium  9.2


 


Total Bilirubin  1.8 H


 


AST  19


 


ALT  20


 


Alkaline Phosphatase  43 L


 


Total Protein  7.0


 


Albumin  4.4








                                        











  07/29/20





  10:54


 


RBC  5.41


 


MCV  86.9


 


MCHC  35.4


 


RDW  12.5


 


MPV  7.9


 


Neutrophils %  68.3


 


Lymphocytes %  22.3


 


Monocytes %  5.5


 


Eosinophils %  2.3


 


Basophils %  1.6














- Medications


Given in the ED: 


ED Medications














Discontinued Medications














Generic Name Dose Route Start Last Admin





  Trade Name Freq  PRN Reason Stop Dose Admin


 


Acetaminophen  650 mg  07/29/20 10:36  07/29/20 10:53





  Tylenol -  PO  07/29/20 10:37  650 mg





  ONCE ONE   Administration














Medical Decision Making





- Medical Decision Making





07/29/20 11:35


54 yo M with lower abd pain, likely mild diverticulitis. No abd tenderness and 

no systemic signs of infection to suggest complicated diverticulitis. Given 

recurrent episodes that resolve with abx alone, including most recent episode ~1

year ago, no systemic signs of infection, tolerating PO and abd non-tender will 

check labs and if no significant abnormalities will d/c with return precautions,

rx for abx and patietn states he can f/u with his PMD in the next 48 hours. 





Plan:


-labs


-cipro 


-flagyl


-if no significant abnormalities on labs will d/c with rx for cipro and flagyl 

and patient to f/u with his PMD in 48 hours, return precautions given. 





This clinical encounter is taking place during a federal and state health care 

emergency attributable to the novel Corona Virus pandemic.


The  of the Department of Health and Human Services has declared, 

pursuant to the Public Health Service Act  319F-3 (42 U.S[place]C.  247d-6d), that a

covered persons activities related to medical countermeasures against COVID-19 

will be immune from liability under Federal and State law.





Discharge





- Discharge Information


Problems reviewed: Yes


Clinical Impression/Diagnosis: 


 Diverticulitis





Condition: Stable


Disposition: HOME





- Admission


No





- Additional Discharge Information


Prescriptions: 


Ciprofloxacin [Cipro -] 500 mg PO Q12H #20 tablet


metroNIDAZOLE [Flagyl -] 500 mg PO TID #30 tablet





- Follow up/Referral





- Patient Discharge Instructions


Patient Printed Discharge Instructions:  DI for Diverticulitis


Additional Instructions: 


You are being discharged with a prescription for ciprofloxacin and flagyl. You 

should make sure to complete the 10 day course. You should follow up with your 

PMD in the next 48 hours. Return to the ED for new or worsening symptoms. 





- Post Discharge Activity

## 2020-07-31 ENCOUNTER — HOSPITAL ENCOUNTER (EMERGENCY)
Dept: HOSPITAL 74 - FER | Age: 55
Discharge: HOME | End: 2020-07-31
Payer: COMMERCIAL

## 2020-07-31 VITALS — BODY MASS INDEX: 35.1 KG/M2

## 2020-07-31 VITALS — HEART RATE: 60 BPM | DIASTOLIC BLOOD PRESSURE: 83 MMHG | SYSTOLIC BLOOD PRESSURE: 160 MMHG | TEMPERATURE: 98.1 F

## 2020-07-31 DIAGNOSIS — N28.1: ICD-10-CM

## 2020-07-31 DIAGNOSIS — R14.1: Primary | ICD-10-CM

## 2020-07-31 LAB
ALBUMIN SERPL-MCNC: 4.5 G/DL (ref 3.4–5)
ALP SERPL-CCNC: 46 U/L (ref 45–117)
ALT SERPL-CCNC: 26 U/L (ref 13–61)
ANION GAP SERPL CALC-SCNC: 9 MMOL/L (ref 8–16)
AST SERPL-CCNC: 27 U/L (ref 15–37)
BASOPHILS # BLD: 1 % (ref 0–2)
BILIRUB SERPL-MCNC: 1.6 MG/DL (ref 0.2–1)
BUN SERPL-MCNC: 11 MG/DL (ref 7–18)
CALCIUM SERPL-MCNC: 8.8 MG/DL (ref 8.5–10)
CHLORIDE SERPL-SCNC: 104 MMOL/L (ref 98–107)
CO2 SERPL-SCNC: 24 MMOL/L (ref 21–32)
CREAT SERPL-MCNC: 0.8 MG/DL (ref 0.55–1.3)
DEPRECATED RDW RBC AUTO: 12.5 % (ref 11.9–15.9)
EOSINOPHIL # BLD: 2.6 % (ref 0–4.5)
GLUCOSE SERPL-MCNC: 93 MG/DL (ref 74–106)
HCT VFR BLD CALC: 46.5 % (ref 35.4–49)
HGB BLD-MCNC: 16.6 GM/DL (ref 11.7–16.9)
LYMPHOCYTES # BLD: 24.8 % (ref 8–40)
MCH RBC QN AUTO: 30.5 PG (ref 25.7–33.7)
MCHC RBC AUTO-ENTMCNC: 35.6 G/DL (ref 32–35.9)
MCV RBC: 85.6 FL (ref 80–96)
MONOCYTES # BLD AUTO: 6.2 % (ref 3.8–10.2)
NEUTROPHILS # BLD: 65.4 % (ref 42.8–82.8)
PLATELET # BLD AUTO: 141 K/MM3 (ref 134–434)
PMV BLD: 8.3 FL (ref 7.5–11.1)
POTASSIUM SERPLBLD-SCNC: 4 MMOL/L (ref 3.5–5.1)
PROT SERPL-MCNC: 6.6 G/DL (ref 6.4–8.2)
RBC # BLD AUTO: 5.44 M/MM3 (ref 4–5.6)
SODIUM SERPL-SCNC: 137 MMOL/L (ref 136–145)
WBC # BLD AUTO: 7.7 K/MM3 (ref 4–10.8)

## 2020-07-31 PROCEDURE — 3E033NZ INTRODUCTION OF ANALGESICS, HYPNOTICS, SEDATIVES INTO PERIPHERAL VEIN, PERCUTANEOUS APPROACH: ICD-10-PCS

## 2020-07-31 PROCEDURE — C1887 CATHETER, GUIDING: HCPCS

## 2020-07-31 NOTE — PDOC
Documentation entered by Xavier León SCRIBE, acting as scribe for 

Zaida Dewitt MD.








Zaida Dewitt MD:  This documentation has been prepared by the betyibe, 

Xavier León SCRIBE, under my direction and personally reviewed by me in its

entirety.  I confirm that the documentation accurately reflects all work, 

treatment, procedures, and medical decision making performed by me.  





History of Present Illness





- General


Chief Complaint: Pain, Acute


Stated Complaint: LT FLANK PAIN


Time Seen by Provider: 07/31/20 20:46


History Source: Patient


Exam Limitations: No Limitations





- History of Present Illness


Initial Comments: 





07/31/20 21:00


The patient is a 55 year old male with a significant past medical history of 

recurrent diverticulitis (never complicated), kidney stones, enlarged spleen, 

fatty liver, and kidney tumor (to be surgically removed 8/5) who presents to the

emergency department for evaluation of left flank pain that began today and low

er abdominal pain that began four days ago. The patient endorses taking 

ibuprofen two hours ago to no relief. He presented to the ED two days ago for 

two days of lower abdominal pain and was given flagyl and cipro. The patient 

notes his last meal was hot dogs at 3pm and his last bowel movement was prior to

presentation in the ED.





The patient denies chest pain and shortness of breath. Denies fever, chills, 

nausea, and vomiting. Denies any other symptoms. 





Allergies: NKDA


Social Hx: The patient reports he smokes 40 cigarettes per day.


Surgical Hx: right inguinal and umbilical hernia repair 2002, left inguinal 

hernia repair 1990s  





PCP: Dr. Esposito








Past History





- Medical History


Allergies/Adverse Reactions: 


                                    Allergies











Allergy/AdvReac Type Severity Reaction Status Date / Time


 


No Known Allergies Allergy   Verified 07/29/20 10:02











Home Medications: 


Ambulatory Orders





Ciprofloxacin [Cipro -] 500 mg PO Q12H #20 tablet 07/29/20 


metroNIDAZOLE [Flagyl -] 500 mg PO TID #30 tablet 07/29/20 








Anemia: No


Asthma: Yes


Cancer: No


Cardiac Disorders: No


CVA: No


COPD: No


CHF: No


Dementia: No


Diabetes: No


GI Disorders: Yes (DIVERTICULITIS)


 Disorders: No


HTN: No


Hypercholesterolemia: No


Kidney Stones: Yes


Liver Disease: No


Seizures: No


Thyroid Disease: No





- Surgical History


Abdominal Surgery: Yes (RIGHT INGUINAL AND UMBILICAL HERNIA REPAIR 2002,LEFT 

INGUINAL HERNIA 1990'S)


Appendectomy: No


Cardiac Surgery: No


Cholecystectomy: No


Lung Surgery: No


Neurologic Surgery: No


Orthopedic Surgery: No





- Psycho-Social/Smoking History


Smoking History: Current every day smoker


Have you smoked in the past 12 months: No


Number of Cigarettes Smoked Daily: 20


Information on smoking cessation initiated: Yes


'Breaking Loose' booklet given: 05/18/18





**Review of Systems





- Review of Systems


Comments:: 





07/31/20 21:00


CONSTITUTIONAL:


Absent: fever, no chills, no fatigue


EYES:


Absent: visual changes


ENT:


Absent: ear pain, no sore throat


CARDIOVASCULAR:


Absent: chest pain, no palpitations


RESPIRATORY:


Absent: cough, no SOB


GI: +left flank pain, abdominal pain


Absent: no nausea, no vomiting, no constipation, no diarrhea


GENITOURINARY:


Absent: dysuria, no frequency, no hematuria


MUSKULOSKELETAL:


Absent: back pain, no arthralgia, no myalgia


SKIN:


Absent: rash


NEURO:


Absent: headache





All Other Systems: Reviewed and Negative





*Physical Exam





- Vital Signs


                                Last Vital Signs











Temp Pulse Resp BP Pulse Ox


 


 98.1 F   60   18   160/83   98 


 


 07/31/20 20:26  07/31/20 20:26  07/31/20 20:26  07/31/20 20:26  07/31/20 20:26














- Physical Exam





07/31/20 21:00


GENERAL: 


Well-appearing, well-nourished. No apparent distress.


HEENT: 


Normocephalic, atraumatic. PERRL, EOM intact.


CARDIOVASCULAR: 


Normal S1, S2. Regular rate and rhythm.


PULMONARY: 


Clear to auscultation bilaterally.


ABDOMEN: +minimal tenderness of his left mid abdomen


Soft, non-distended.


EXTREMITIES: 


Normal ROM in all four extremities. No gross deformities.


SKIN: 


Warm, dry.  No rash


NEUROLOGICAL: 


No focal neurological deficits.








ED Treatment Course





- LABORATORY


CBC & Chemistry Diagram: 


                                 07/31/20 21:05





                                 07/31/20 21:05





- ADDITIONAL ORDERS


Additional order review: 


                               Laboratory  Results











  07/31/20





  20:25


 


Urine Color  Yellow


 


Urine Appearance  Clear


 


Urine pH  5.0


 


Urine Protein  Negative


 


Urine Glucose (UA)  Negative


 


Urine Ketones  Negative


 


Urine Blood  Negative


 


Urine Nitrite  Negative


 


Urine Bilirubin  Negative


 


Urine Urobilinogen  0.2


 


Ur Leukocyte Esterase  Trace H














Medical Decision Making





- Medical Decision Making





07/31/20 21:59


All labs are normal. Pt has slight elevation of T bili due to fatty liver.


Pt is awaiting CT scan results.


Left kidney neoplasm is small


07/31/20 23:26


labs normal


CT scan normal


Pt will continue abx for civerticulitis for only 3 more days. Follow with PMD





Discharge





- Discharge Information


Problems reviewed: Yes


Clinical Impression/Diagnosis: 


 Gas pain, Renal cyst





Condition: Stable


Disposition: HOME





- Follow up/Referral


Referrals: 


Chaitanya Esposito MD [Primary Care Provider] - 





- Patient Discharge Instructions


Patient Printed Discharge Instructions:  Eating a Diet Rich in Fruits and 

Vegetables, DI for Dyspepsia





- Post Discharge Activity

## 2020-08-31 PROBLEM — Z00.00 ENCOUNTER FOR PREVENTIVE HEALTH EXAMINATION: Status: ACTIVE | Noted: 2020-08-31

## 2020-09-21 ENCOUNTER — APPOINTMENT (OUTPATIENT)
Dept: GASTROENTEROLOGY | Facility: CLINIC | Age: 55
End: 2020-09-21
Payer: COMMERCIAL

## 2020-09-21 VITALS
HEART RATE: 51 BPM | BODY MASS INDEX: 34.36 KG/M2 | HEIGHT: 70 IN | DIASTOLIC BLOOD PRESSURE: 86 MMHG | SYSTOLIC BLOOD PRESSURE: 148 MMHG | WEIGHT: 240 LBS

## 2020-09-21 DIAGNOSIS — K57.32 DIVERTICULITIS OF LARGE INTESTINE W/OUT PERFORATION OR ABSCESS W/OUT BLEEDING: ICD-10-CM

## 2020-09-21 DIAGNOSIS — Z86.010 PERSONAL HISTORY OF COLONIC POLYPS: ICD-10-CM

## 2020-09-21 PROCEDURE — 99203 OFFICE O/P NEW LOW 30 MIN: CPT

## 2020-09-21 RX ORDER — ALBUTEROL SULFATE 90 UG/1
108 (90 BASE) AEROSOL, METERED RESPIRATORY (INHALATION)
Refills: 0 | Status: ACTIVE | COMMUNITY

## 2020-09-21 NOTE — REASON FOR VISIT
[Consultation] : a consultation visit [FreeTextEntry1] : Kindly asked by Dr. Galindo to consult and evaluate patient for   hx colon polyps, diverticulitis                 \par A copy of this note is being sent to physician requesting consultation.

## 2020-09-21 NOTE — CONSULT LETTER
[DrThiago  ___] : Dr. AHUJA [FreeTextEntry1] : Dear Dr. Galindo,\par \par I had the pleasure of evaluating your patient,  SHAMIKA CASTELLON.\par \par Please refer to my note below.\par \par Thank you very much for allowing me to participate in the care of this patient.  If you have any questions, please do not hesitate to contact me.\par \par Sincerely, \par \par Nba Ashley MD\par

## 2020-09-21 NOTE — ASSESSMENT
[FreeTextEntry1] : Diverticulitis resolved.  Plans surgical resection - due for colon surveillance.  Will coordinate to plan colonoscopy for day before resection to avoid 2nd prep.  Colonoscopy scheduled - Risks, benefits, alternatives were discussed, including but not limited to bleeding, infection, perforation and sedation risks. Additionally, the possibility of missed lesions was conveyed.\par \par

## 2020-09-21 NOTE — HISTORY OF PRESENT ILLNESS
[FreeTextEntry1] : 55m hx hernia repair (umb), with diverticulitis bouts - multiple - has surgery planned for elective resection on 10/15/20 - per pt and Dr. Galindo's request, colonoscopy day prior.  He is due for colon surveillance.  Pt denies abdominal pain, rectal bleeding, change in bowel habits, or unexplained weight loss.  \par \par Last diverticulitis bout at [facility] 6 wks ago.  Feels well now.  No abd pain.\par \par Last colonoscopy: \par 4/2017 erythema/edema throughout diverticular sigmoid, multiple adenomatous polps - largest on stalk in sigmoid.  Int hemorroids --> 3y f/u rec.\par \par Soc:  no tobacco or significant EtOH\par FHx: no FHx GI malignancy or IBD\par \par ROS:\par Constitutional:: no weight loss, fevers\par ENT: no deafness\par Eyes: not blind\par Neck: no LN\par Chest: no dyspnea/cough\par Cardiac: no chest pain\par Vascular: no leg swelling\par GI: no abdominal pain, nausea, vomiting, diarrhea, constipation, rectal bleeding, dysphagia, melena unless otherwise noted in HPI\par : no dysuria, dark urine\par Skin: no rashes, jaundice\par Heme: no bleeding\par Endocrine: no DM unless otherwise stated in HPI\par \par Px: (VS noted below)\par General: NAD\par Eyes: anicteric\par Oropharynx:  clear\par Neck: no LN\par Chest: normal respiratory effort\par CVS: regular\par Abd: soft, NT, ND, +BS, no HSM\par Ext: no atrophy\par Neuro: grossly nonfocal\par \par Labs/imaging/prior endoscopic results reviewed to the extent available and noted in HPI\par

## 2020-10-12 ENCOUNTER — RESULT REVIEW (OUTPATIENT)
Age: 55
End: 2020-10-12

## 2020-10-13 ENCOUNTER — RESULT REVIEW (OUTPATIENT)
Age: 55
End: 2020-10-13

## 2020-10-14 ENCOUNTER — APPOINTMENT (OUTPATIENT)
Dept: GASTROENTEROLOGY | Facility: HOSPITAL | Age: 55
End: 2020-10-14

## 2025-09-04 ENCOUNTER — APPOINTMENT (OUTPATIENT)
Dept: ORTHOPEDIC SURGERY | Facility: CLINIC | Age: 60
End: 2025-09-04
Payer: COMMERCIAL

## 2025-09-04 ENCOUNTER — NON-APPOINTMENT (OUTPATIENT)
Age: 60
End: 2025-09-04

## 2025-09-04 VITALS
SYSTOLIC BLOOD PRESSURE: 150 MMHG | HEIGHT: 70 IN | OXYGEN SATURATION: 99 % | WEIGHT: 200 LBS | BODY MASS INDEX: 28.63 KG/M2 | TEMPERATURE: 98.2 F | HEART RATE: 78 BPM | DIASTOLIC BLOOD PRESSURE: 76 MMHG | RESPIRATION RATE: 16 BRPM

## 2025-09-04 DIAGNOSIS — Z00.00 ENCOUNTER FOR GENERAL ADULT MEDICAL EXAMINATION W/OUT ABNORMAL FINDINGS: ICD-10-CM

## 2025-09-04 DIAGNOSIS — R22.32 LOCALIZED SWELLING, MASS AND LUMP, LEFT UPPER LIMB: ICD-10-CM

## 2025-09-04 DIAGNOSIS — L02.414 CUTANEOUS ABSCESS OF LEFT UPPER LIMB: ICD-10-CM

## 2025-09-04 PROCEDURE — 20612 ASPIRATE/INJ GANGLION CYST: CPT | Mod: LT

## 2025-09-04 PROCEDURE — 99204 OFFICE O/P NEW MOD 45 MIN: CPT | Mod: 25

## 2025-09-04 PROCEDURE — 73110 X-RAY EXAM OF WRIST: CPT | Mod: LT

## 2025-09-04 PROCEDURE — 99203 OFFICE O/P NEW LOW 30 MIN: CPT

## 2025-09-04 RX ORDER — GABAPENTIN 300 MG/1
300 CAPSULE ORAL
Qty: 90 | Refills: 0 | Status: ACTIVE | COMMUNITY
Start: 2025-09-04 | End: 1900-01-01

## 2025-09-09 ENCOUNTER — RESULT REVIEW (OUTPATIENT)
Age: 60
End: 2025-09-09

## 2025-09-18 ENCOUNTER — NON-APPOINTMENT (OUTPATIENT)
Age: 60
End: 2025-09-18